# Patient Record
(demographics unavailable — no encounter records)

---

## 2025-07-25 NOTE — HISTORY OF PRESENT ILLNESS
[Mother] : mother [Yes] : Patient goes to dentist yearly [Toothpaste] : Primary Fluoride Source: Toothpaste [Up to date] : Up to date [Eats meals with family] : eats meals with family [Has family members/adults to turn to for help] : has family members/adults to turn to for help [Is permitted and is able to make independent decisions] : Is permitted and is able to make independent decisions [Sleep Concerns] : no sleep concerns [Grade: ____] : Grade: [unfilled] [Normal Performance] : normal performance [Eats regular meals including adequate fruits and vegetables] : does not eat regular meals including adequate fruits and vegetables [Drinks non-sweetened liquids] : drinks non-sweetened liquids  [Calcium source] : calcium source [Has friends] : has friends [At least 1 hour of physical activity a day] : at least 1 hour of physical activity a day [Screen time (except homework) less than 2 hours a day] : screen time (except homework) less than 2 hours a day [Uses electronic nicotine delivery system] : does not use electronic nicotine delivery system [Uses tobacco] : does not use tobacco [Uses drugs] : does not use drugs  [Drinks alcohol] : does not drink alcohol [No] : No cigarette smoke exposure [FreeTextEntry7] : 12 yr [facility].  Patient doing well.  No parental concerns.  [de-identified] : Could be better with fruits and veggies.  [de-identified] : travel baseball [FreeTextEntry1] : - Coordination of care form reviewed. - Cardiac screening MGF  from [place] age 47, pt sees cardiology and is cleared. - Discussed 5-2-1-0 questionnaire with parent (and patient, if age appropriate and able to comprehend.)  Concerns and issues addressed if indicated.   - CRAFFT form and PHQ reviewed.

## 2025-07-25 NOTE — DISCUSSION/SUMMARY
[Physical Growth and Development] : physical growth and development [Social and Academic Competence] : social and academic competence [Emotional Well-Being] : emotional well-being [Risk Reduction] : risk reduction [Violence and Injury Prevention] : violence and injury prevention [Patient] : patient [Mother] : mother [Full Activity without restrictions including Physical Education & Athletics] : Full Activity without restrictions including Physical Education & Athletics [FreeTextEntry1] : - HPV vaccination discussed and deferred. - Follow up in 1 year for annual physical or sooner PRN.

## 2025-07-25 NOTE — PHYSICAL EXAM
